# Patient Record
Sex: FEMALE | URBAN - METROPOLITAN AREA
[De-identification: names, ages, dates, MRNs, and addresses within clinical notes are randomized per-mention and may not be internally consistent; named-entity substitution may affect disease eponyms.]

---

## 2018-10-25 ENCOUNTER — APPOINTMENT (RX ONLY)
Dept: URBAN - METROPOLITAN AREA CLINIC 58 | Facility: CLINIC | Age: 33
Setting detail: DERMATOLOGY
End: 2018-10-25

## 2018-10-25 DIAGNOSIS — L30.9 DERMATITIS, UNSPECIFIED: ICD-10-CM

## 2018-10-25 PROCEDURE — 99202 OFFICE O/P NEW SF 15 MIN: CPT

## 2018-10-25 PROCEDURE — ? PRESCRIPTION

## 2018-10-25 PROCEDURE — ? COUNSELING

## 2018-10-25 RX ORDER — EMOLLIENT COMBINATION NO.32
PEA SIZE AMOUNT EMULSION, EXTENDED RELEASE TOPICAL BID
Qty: 1 | Refills: 0 | Status: ERX | COMMUNITY
Start: 2018-10-25

## 2018-10-25 RX ORDER — HYDROCORTISONE 25 MG/G
PEA SIZE AMOUNT CREAM TOPICAL BID
Qty: 1 | Refills: 0 | Status: ERX | COMMUNITY
Start: 2018-10-25

## 2018-10-25 RX ADMIN — HYDROCORTISONE PEA SIZE AMOUNT: 25 CREAM TOPICAL at 00:00

## 2018-10-25 RX ADMIN — Medication PEA SIZE AMOUNT: at 00:00

## 2018-10-25 NOTE — HPI: BURN, LIP
How Severe Is It?: mild
How Is Your Wound Healing?: healing well
Is This A New Presentation, Or A Follow-Up?: Lip Burn

## 2018-10-25 NOTE — PROCEDURE: MIPS QUALITY
Quality 226: Preventive Care And Screening: Tobacco Use: Screening And Cessation Intervention: Patient screened for tobacco and never smoked
Detail Level: Zone
Quality 110: Preventive Care And Screening: Influenza Immunization: Influenza immunization was not ordered or administered, reason not given
Quality 431: Preventive Care And Screening: Unhealthy Alcohol Use - Screening: Patient screened for unhealthy alcohol use using a single question and scores less than 2 times per year